# Patient Record
Sex: MALE | Race: WHITE | NOT HISPANIC OR LATINO | URBAN - METROPOLITAN AREA
[De-identification: names, ages, dates, MRNs, and addresses within clinical notes are randomized per-mention and may not be internally consistent; named-entity substitution may affect disease eponyms.]

---

## 2018-06-05 NOTE — PATIENT DISCUSSION
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  No evidence of conversion to wet.

## 2019-03-01 ENCOUNTER — IMPORTED ENCOUNTER (OUTPATIENT)
Dept: URBAN - METROPOLITAN AREA CLINIC 9 | Facility: CLINIC | Age: 49
End: 2019-03-01

## 2021-08-09 ENCOUNTER — IMPORTED ENCOUNTER (OUTPATIENT)
Dept: URBAN - METROPOLITAN AREA CLINIC 9 | Facility: CLINIC | Age: 51
End: 2021-08-09

## 2021-08-09 PROBLEM — H04.123: Noted: 2021-08-09

## 2021-08-09 PROBLEM — H25.13: Noted: 2021-08-09

## 2021-08-09 PROBLEM — H52.4: Noted: 2021-08-09

## 2021-10-16 ASSESSMENT — TONOMETRY
OD_IOP_MMHG: 16
OS_IOP_MMHG: 14
OD_IOP_MMHG: 15
OS_IOP_MMHG: 17

## 2021-10-16 ASSESSMENT — KERATOMETRY
OD_AXISANGLE2_DEGREES: 129
OS_K1POWER_DIOPTERS: 42.25
OS_AXISANGLE2_DEGREES: 44
OD_AXISANGLE_DEGREES: 39
OD_K2POWER_DIOPTERS: 42.25
OD_K1POWER_DIOPTERS: 42
OS_K2POWER_DIOPTERS: 42.75
OS_AXISANGLE_DEGREES: 134

## 2021-10-16 ASSESSMENT — VISUAL ACUITY
OS_SC: 20/25 +2 SN
OD_SC: 20/30 SN
OD_SC: 20/20 -2 SN
OS_CC: 20/25 SN
OS_SC: 20/40 SN
OD_CC: 20/20 SN
OD_CC: 20/25 SN
OS_CC: 20/25 + SN

## 2022-01-17 NOTE — PATIENT DISCUSSION
VISUALLY SIGNIFICANT: I have discussed the option of glasses versus cataract surgery versus following. It was explained that when the patients vision no longer meets their visual needs and a glasses prescription does not improve visual symptoms, the option of cataract surgery is a reasonable next step. It was explained that there is no guarantee that removing the cataract will improve their visual symptoms, however; it is believed that the cataract is contributing to the patient's visual impairment and surgery may improve both the visual and functional status of the patient. The risks, benefits and alternatives of surgery were discussed with the patient. After this discussion, the patient desires to wait for surgical management. Monitor annually.

## 2022-01-17 NOTE — PATIENT DISCUSSION
monovision CLRx- trials to be ordered. Dailies given today in potential change until monthly lenses come in. Dispensing appointment warranted.

## 2022-01-17 NOTE — PATIENT DISCUSSION
clear to [place] [facility]. Seen by Dr. Amanda Nayak annually. Continue present mangement.

## 2022-02-15 NOTE — PATIENT DISCUSSION
Patient requested new glasses Rx be printed with OD at -10.00 instead of -10.50 due to PAL limitation at optical she went to. Showed patient difference and she is ok with less minus. New glasses Rx today.

## 2022-02-15 NOTE — PATIENT DISCUSSION
clear to [facility]. Seen by Dr. Yoel Holland annually. Continue present mangement.

## 2022-02-15 NOTE — PATIENT DISCUSSION
Dispensed CL trials. Gave patient 2 different lenses to try for OS ( just one trial OD). Wear preferred lens OS to CL f/u.

## 2022-03-01 NOTE — PATIENT DISCUSSION
clear to [place] [facility]. Seen by Dr. Bernabe Trimble annually. Continue present mangement.

## 2022-03-24 NOTE — PATIENT DISCUSSION
Patient saw Dr. Jose Eng several years ago for a refractive consult.  They discussed ICLs but at the time that was and still is cost prohibitive.  Discussed the fact that Dr. Jennifer Tan saw her a few months ago and told her she has visually significant cataracts.  Discussed the possibility of RLE to try to eliminate ctls from the equation.  Advised her that those aren't perfect either but would seem to address the most problems she's consistently having by taking the surface of the eye out of the equation.  Going to set up an eval with Dr. Jose Eng.

## 2022-03-24 NOTE — PATIENT DISCUSSION
Discussed option of referral for expression vs. monitoring.  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

## 2022-03-24 NOTE — PATIENT DISCUSSION
**** Correspondence from Dr. Tessa Monet that he had evaluated Deonna Jackie and she is approved for ICL or RLE and Ms. Aletha Roberson is exploring her options with finances at this time ****.

## 2022-03-24 NOTE — PATIENT DISCUSSION
clear to [place] [facility]. Seen by Dr. Sapna Garcia annually. Continue present mangement.

## 2022-03-24 NOTE — PATIENT DISCUSSION
Lengthy discussion about her chronic LIGIA, her history with ctls (reminded her that Dr. Sin Vazquez stated in the chart that she is ctl intolerant), steroid responded so can't go the route of a steroid.  Discussed the fact that she tried xiidra twice and most recently tried restasis in 2020.  She has no recollection as to whether restasis improved her symptoms.  I told her that most people, including myself, who take xiidra or restasis can cite an improvement in their symptoms after taking it.  She has no recollection of anything about restasis or xiidra.  Id Per her request I did send in an rx for restasis.  Discussed the fact it takes months to kick in.

## 2022-06-25 RX ORDER — TADALAFIL 5 MG/1
TABLET ORAL
COMMUNITY
Start: 2022-03-08 | End: 2023-03-03

## 2022-06-25 RX ORDER — ZOLPIDEM TARTRATE 10 MG/1
TABLET ORAL
COMMUNITY
Start: 2021-12-21

## 2022-06-25 RX ORDER — VENLAFAXINE HYDROCHLORIDE 150 MG/1
CAPSULE, EXTENDED RELEASE ORAL
COMMUNITY

## 2022-08-12 ENCOUNTER — ESTABLISHED PATIENT (OUTPATIENT)
Dept: URBAN - METROPOLITAN AREA CLINIC 9 | Facility: CLINIC | Age: 52
End: 2022-08-12

## 2022-08-12 DIAGNOSIS — H25.043: ICD-10-CM

## 2022-08-12 DIAGNOSIS — H25.13: ICD-10-CM

## 2022-08-12 DIAGNOSIS — H04.123: ICD-10-CM

## 2022-08-12 PROCEDURE — 92015 DETERMINE REFRACTIVE STATE: CPT

## 2022-08-12 PROCEDURE — 92014 COMPRE OPH EXAM EST PT 1/>: CPT

## 2022-08-12 ASSESSMENT — TONOMETRY
OS_IOP_MMHG: 14
OD_IOP_MMHG: 15

## 2022-08-12 ASSESSMENT — VISUAL ACUITY
OS_CC: 20/40-2
OD_CC: 20/30
OD_SC: 20/25-1
OS_SC: 20/25

## 2022-09-30 PROBLEM — R73.01 IMPAIRED FASTING GLUCOSE: Status: ACTIVE | Noted: 2022-09-30

## 2022-09-30 PROBLEM — F51.01 PRIMARY INSOMNIA: Status: ACTIVE | Noted: 2022-09-30

## 2022-09-30 PROBLEM — F32.0 MAJOR DEPRESSIVE DISORDER, SINGLE EPISODE, MILD (HCC): Status: ACTIVE | Noted: 2022-09-30

## 2022-09-30 PROBLEM — F32.A DEPRESSIVE DISORDER: Status: ACTIVE | Noted: 2022-09-30

## 2022-09-30 PROBLEM — I86.1 VARICOCELE: Status: ACTIVE | Noted: 2022-09-30

## 2022-09-30 PROBLEM — I82.409 DEEP VEIN THROMBOSIS (DVT) OF LOWER EXTREMITY (HCC): Status: ACTIVE | Noted: 2022-09-30

## 2022-09-30 PROBLEM — N52.9 ERECTILE DYSFUNCTION: Status: ACTIVE | Noted: 2022-09-30

## 2022-09-30 PROBLEM — G62.9 PERIPHERAL NEUROPATHY: Status: ACTIVE | Noted: 2022-09-30

## 2022-09-30 PROBLEM — Z79.01 LONG TERM CURRENT USE OF ANTICOAGULANT THERAPY: Status: ACTIVE | Noted: 2022-09-30

## 2022-09-30 PROBLEM — C92.11 CHRONIC MYELOID LEUKEMIA IN REMISSION (HCC): Status: ACTIVE | Noted: 2022-09-30

## 2022-09-30 PROBLEM — J30.1 SEASONAL ALLERGIC RHINITIS DUE TO POLLEN: Status: ACTIVE | Noted: 2022-09-30

## 2023-08-18 ENCOUNTER — ESTABLISHED PATIENT (OUTPATIENT)
Dept: URBAN - METROPOLITAN AREA CLINIC 9 | Facility: CLINIC | Age: 53
End: 2023-08-18

## 2023-08-18 DIAGNOSIS — H04.123: ICD-10-CM

## 2023-08-18 DIAGNOSIS — H25.13: ICD-10-CM

## 2023-08-18 DIAGNOSIS — H25.043: ICD-10-CM

## 2023-08-18 PROCEDURE — 92015 DETERMINE REFRACTIVE STATE: CPT

## 2023-08-18 PROCEDURE — 92014 COMPRE OPH EXAM EST PT 1/>: CPT

## 2023-08-18 ASSESSMENT — VISUAL ACUITY
OD_SC: 20/30
OS_SC: 20/25

## 2024-08-26 ENCOUNTER — COMPREHENSIVE EXAM (OUTPATIENT)
Facility: LOCATION | Age: 54
End: 2024-08-26

## 2024-08-26 DIAGNOSIS — H25.13: ICD-10-CM

## 2024-08-26 DIAGNOSIS — H25.043: ICD-10-CM

## 2024-08-26 DIAGNOSIS — H04.123: ICD-10-CM

## 2024-08-26 DIAGNOSIS — H52.4: ICD-10-CM

## 2024-08-26 PROCEDURE — 92014 COMPRE OPH EXAM EST PT 1/>: CPT

## 2024-08-26 PROCEDURE — 92015 DETERMINE REFRACTIVE STATE: CPT

## 2024-08-26 ASSESSMENT — VISUAL ACUITY
OD_SC: 20/40
OS_SC: 20/20

## 2024-08-26 ASSESSMENT — TONOMETRY
OS_IOP_MMHG: 18
OD_IOP_MMHG: 17